# Patient Record
Sex: MALE | Race: WHITE | NOT HISPANIC OR LATINO | Employment: FULL TIME | URBAN - METROPOLITAN AREA
[De-identification: names, ages, dates, MRNs, and addresses within clinical notes are randomized per-mention and may not be internally consistent; named-entity substitution may affect disease eponyms.]

---

## 2017-01-30 ENCOUNTER — ALLSCRIPTS OFFICE VISIT (OUTPATIENT)
Dept: OTHER | Facility: OTHER | Age: 67
End: 2017-01-30

## 2017-07-17 DIAGNOSIS — D64.9 ANEMIA: ICD-10-CM

## 2017-07-17 DIAGNOSIS — C18.9 MALIGNANT NEOPLASM OF COLON (HCC): ICD-10-CM

## 2017-08-31 ENCOUNTER — ALLSCRIPTS OFFICE VISIT (OUTPATIENT)
Dept: OTHER | Facility: OTHER | Age: 67
End: 2017-08-31

## 2018-01-13 VITALS
WEIGHT: 138.25 LBS | BODY MASS INDEX: 23.03 KG/M2 | OXYGEN SATURATION: 98 % | HEIGHT: 65 IN | TEMPERATURE: 97.3 F | SYSTOLIC BLOOD PRESSURE: 132 MMHG | RESPIRATION RATE: 16 BRPM | DIASTOLIC BLOOD PRESSURE: 82 MMHG | HEART RATE: 73 BPM

## 2018-01-14 VITALS
SYSTOLIC BLOOD PRESSURE: 142 MMHG | BODY MASS INDEX: 22.53 KG/M2 | OXYGEN SATURATION: 99 % | DIASTOLIC BLOOD PRESSURE: 76 MMHG | WEIGHT: 135.25 LBS | HEIGHT: 65 IN | TEMPERATURE: 97.1 F | HEART RATE: 68 BPM | RESPIRATION RATE: 15 BRPM

## 2018-02-19 DIAGNOSIS — D64.9 ANEMIA: ICD-10-CM

## 2018-02-19 DIAGNOSIS — C18.9 MALIGNANT NEOPLASM OF COLON (HCC): ICD-10-CM

## 2018-03-08 ENCOUNTER — OFFICE VISIT (OUTPATIENT)
Dept: HEMATOLOGY ONCOLOGY | Facility: CLINIC | Age: 68
End: 2018-03-08
Payer: COMMERCIAL

## 2018-03-08 VITALS
HEART RATE: 73 BPM | WEIGHT: 140.4 LBS | BODY MASS INDEX: 23.39 KG/M2 | DIASTOLIC BLOOD PRESSURE: 78 MMHG | HEIGHT: 65 IN | RESPIRATION RATE: 16 BRPM | OXYGEN SATURATION: 98 % | TEMPERATURE: 96.8 F | SYSTOLIC BLOOD PRESSURE: 142 MMHG

## 2018-03-08 DIAGNOSIS — C18.2 MALIGNANT NEOPLASM OF ASCENDING COLON (HCC): Primary | ICD-10-CM

## 2018-03-08 PROCEDURE — 99214 OFFICE O/P EST MOD 30 MIN: CPT | Performed by: INTERNAL MEDICINE

## 2018-03-08 RX ORDER — MELATONIN: COMMUNITY

## 2018-03-08 RX ORDER — LEVOTHYROXINE SODIUM 137 UG/1
TABLET ORAL
COMMUNITY

## 2018-03-08 NOTE — LETTER
March 8, 2018     Kit Mart MD  [place] [address]  [address]  [facility] [address]    Patient: Abbi Meng   YOB: 1950   Date of Visit: 3/8/2018       Dear Dr Ulloa Gains:    Thank you for referring Jay Campos to me for evaluation  Below are my notes for this consultation  If you have questions, please do not hesitate to call me  I look forward to following your patient along with you  Sincerely,        Melvina Ladd MD        CC: No Recipients  Melvina Ladd MD  3/8/2018 10:49 PM  Sign at close encounter    HPI:   Abbi Meng is a 79 y o  male  This is a follow-up visit for stage II right colon cancer in 2011 with low risk features and patient had right hemicolectomy  He did not require adjuvant chemotherapy  There has not been recurrence of colon cancer  Patient has been regular with colonoscopy and he states he will be going for EGD and colonoscopy in a month  He has history of iron deficiency and he has been taking 1 iron tablet daily  He is not anemic anymore and ferritin level has normalized  Patient is vegetarian     He has gynecomastia  He had breast imaging studies and there was no malignancy  May 2015 patient had total thyroidectomy for thyroid nodule and final path report was Hashimoto thyroiditis  He is on Synthroid  He has much less tiredness    He goes to gym 4 days a week    Current Outpatient Prescriptions:     B Complex Vitamins (VITAMIN B COMPLEX PO), Take 1 capsule by mouth, Disp: , Rfl:     Cholecalciferol (VITAMIN D3) 400 units CAPS, Take by mouth, Disp: , Rfl:     cyanocobalamin (CVS VITAMIN B-12) 1000 MCG tablet, Take by mouth, Disp: , Rfl:     IRON PO, Take 325 mg by mouth, Disp: , Rfl:     levothyroxine (SYNTHROID) 137 mcg tablet, Take by mouth, Disp: , Rfl:     No Known Allergies    ROS:  03/08/18 Reviewed 13 systems:  Presently no headaches, seizures, dizziness, diplopia, dysphagia, hoarseness, chest pain, palpitations, shortness of breath, cough, hemoptysis, abdominal pain, nausea, vomiting, change in bowel habits, melena, hematuria, fever, chills, bleeding, bone pains, skin rash, weight loss, arthritic symptoms, numbness,  weakness, claudication and gait problem  No frequent infections  No hot flashes  Not unusually sensitive to heat or cold  Patient is not anxious     No swelling of the ankles  No swollen glands  /78 (BP Location: Right arm, Cuff Size: Adult)   Pulse 73   Temp (!) 96 8 °F (36 °C) (Tympanic)   Resp 16   Ht 5' 5" (1 651 m)   Wt 63 7 kg (140 lb 6 4 oz)   SpO2 98%   BMI 23 36 kg/m²      Physical Exam:  Alert, oriented, not in distress, no icterus, no oral thrush, no palpable neck mass, clear lung fields, regular heart rate, abdomen  soft and non tender, no palpable abdominal mass, no ascites, no edema of ankles, no calf tenderness, no focal neurological deficit, no skin rash, no palpable lymphadenopathy, good arterial pulses, no clubbing  Patient is not anxious  Performance status 0      IMAGING:  No orders to display       Lab Results  Results for orders placed or performed in visit on 07/18/16   CEA (HISTORICAL)   Result Value Ref Range    CARCINOEMBRYONIC ANTIGEN 1 4 ng/mL   CBC AND DIFFERENTIAL (HISTORICAL)   Result Value Ref Range    WBC 4 3 3 8 - 10 8 Thousand/uL    RBC 4 23 4 20 - 5 80 Million/uL    Hemoglobin 12 5 (L) 13 2 - 17 1 g/dL    Hematocrit 37 7 (L) 38 5 - 50 0 %    MCV 89 3 80 0 - 100 0 fL    MCH 29 5 27 0 - 33 0 pg    MCHC 33 0 32 0 - 36 0 g/dL    Platelets 950 710 - 632 Thousand/uL    RDW 13 7 11 0 - 15 0 %    Neutrophils Absolute 3345 1500 - 7800 cells/uL    Lymphocytes Absolute 430 (L) 850 - 3900 cells/uL    Monocytes Absolute 275 200 - 950 cells/uL    Eosinophils Absolute 228 15 - 500 cells/uL    Basophils Absolute 22 0 - 200 cells/uL    Neutrophils Absolute 77 8 %    Lymphocytes Absolute 10 0 %    MONOCYTES 6 4 %    Eosinophils Absolute 5 3 %    Basophils Relative 0 5 %    MPV 8 7 7 5 - 11 5 fL   COMPREHENSIVE METABOLIC PANEL (HISTORICAL)   Result Value Ref Range    Glucose 112 (H) 65 - 99 mg/dL    BUN 14 7 - 25 mg/dL    Creatinine 0 85 0 70 - 1 25 mg/dL    EGFR-AMERICAN CALC 91 > OR = 60 mL/min/1 73m2    eGFR  105 > OR = 60 mL/min/1 73m2    BUN/CREA Ratio NOT APPLICABLE 6 - 22 (calc)    Sodium 141 135 - 146 mmol/L    Potassium 4 5 3 5 - 5 3 mmol/L    Chloride 104 98 - 110 mmol/L    CO2 28 20 - 31 mmol/L    Calcium 9 2 8 6 - 10 3 mg/dL    Total Protein 7 1 6 1 - 8 1 g/dL    Albumin 4 1 3 6 - 5 1 g/dL    GAMMA GLOBULIN 3 0 1 9 - 3 7 g/dL (calc)    A/G RATIO 1 4 1 0 - 2 5 (calc)    Total Bilirubin 0 9 0 2 - 1 2 mg/dL    Alkaline Phosphatase 68 40 - 115 U/L    AST 16 10 - 35 U/L    ALT 12 9 - 46 U/L             Reviewed and discussed with patient  Assessment and plan:  Vitaliy Dexter is a 79 y o  male with stage II right colon cancer in 2011 with low risk features and he had right hemicolectomy  Colon cancer has been in remission  Iron deficiency anemia and he has been taking 1 iron tablet daily  Hemoglobin is 12 5 and that is stable  He will continue to take 1 iron tablet daily  He will go for EGD and colonoscopy  Condition discussed and explained  Questions answered  He will come back in 6 months  Patient voiced understanding and agreement in the discussion  Counseling / Coordination of Care   Greater than 50% of total time was spent with the patient and / or family counseling and / or coordination of care

## 2018-03-08 NOTE — PROGRESS NOTES
HPI:   Batsheva Maciel is a 79 y o  male  This is a follow-up visit for stage II right colon cancer in 2011 with low risk features and patient had right hemicolectomy  He did not require adjuvant chemotherapy  There has not been recurrence of colon cancer  Patient has been regular with colonoscopy and he states he will be going for EGD and colonoscopy in a month  He has history of iron deficiency and he has been taking 1 iron tablet daily  He is not anemic anymore and ferritin level has normalized  Patient is vegetarian     He has gynecomastia  He had breast imaging studies and there was no malignancy  May 2015 patient had total thyroidectomy for thyroid nodule and final path report was Hashimoto thyroiditis  He is on Synthroid  He has much less tiredness    He goes to gym 4 days a week    Current Outpatient Prescriptions:     B Complex Vitamins (VITAMIN B COMPLEX PO), Take 1 capsule by mouth, Disp: , Rfl:     Cholecalciferol (VITAMIN D3) 400 units CAPS, Take by mouth, Disp: , Rfl:     cyanocobalamin (CVS VITAMIN B-12) 1000 MCG tablet, Take by mouth, Disp: , Rfl:     IRON PO, Take 325 mg by mouth, Disp: , Rfl:     levothyroxine (SYNTHROID) 137 mcg tablet, Take by mouth, Disp: , Rfl:     No Known Allergies    ROS:  03/08/18 Reviewed 13 systems:  Presently no headaches, seizures, dizziness, diplopia, dysphagia, hoarseness, chest pain, palpitations, shortness of breath, cough, hemoptysis, abdominal pain, nausea, vomiting, change in bowel habits, melena, hematuria, fever, chills, bleeding, bone pains, skin rash, weight loss, arthritic symptoms, numbness,  weakness, claudication and gait problem  No frequent infections  No hot flashes  Not unusually sensitive to heat or cold  Patient is not anxious     No swelling of the ankles  No swollen glands        /78 (BP Location: Right arm, Cuff Size: Adult)   Pulse 73   Temp (!) 96 8 °F (36 °C) (Tympanic)   Resp 16   Ht 5' 5" (1 651 m)   Wt 63 7 kg (140 lb 6 4 oz)   SpO2 98%   BMI 23 36 kg/m²     Physical Exam:  Alert, oriented, not in distress, no icterus, no oral thrush, no palpable neck mass, clear lung fields, regular heart rate, abdomen  soft and non tender, no palpable abdominal mass, no ascites, no edema of ankles, no calf tenderness, no focal neurological deficit, no skin rash, no palpable lymphadenopathy, good arterial pulses, no clubbing  Patient is not anxious  Performance status 0      IMAGING:  No orders to display       Lab Results  Results for orders placed or performed in visit on 07/18/16   CEA (HISTORICAL)   Result Value Ref Range    CARCINOEMBRYONIC ANTIGEN 1 4 ng/mL   CBC AND DIFFERENTIAL (HISTORICAL)   Result Value Ref Range    WBC 4 3 3 8 - 10 8 Thousand/uL    RBC 4 23 4 20 - 5 80 Million/uL    Hemoglobin 12 5 (L) 13 2 - 17 1 g/dL    Hematocrit 37 7 (L) 38 5 - 50 0 %    MCV 89 3 80 0 - 100 0 fL    MCH 29 5 27 0 - 33 0 pg    MCHC 33 0 32 0 - 36 0 g/dL    Platelets 647 281 - 493 Thousand/uL    RDW 13 7 11 0 - 15 0 %    Neutrophils Absolute 3345 1500 - 7800 cells/uL    Lymphocytes Absolute 430 (L) 850 - 3900 cells/uL    Monocytes Absolute 275 200 - 950 cells/uL    Eosinophils Absolute 228 15 - 500 cells/uL    Basophils Absolute 22 0 - 200 cells/uL    Neutrophils Absolute 77 8 %    Lymphocytes Absolute 10 0 %    MONOCYTES 6 4 %    Eosinophils Absolute 5 3 %    Basophils Relative 0 5 %    MPV 8 7 7 5 - 11 5 fL   COMPREHENSIVE METABOLIC PANEL (HISTORICAL)   Result Value Ref Range    Glucose 112 (H) 65 - 99 mg/dL    BUN 14 7 - 25 mg/dL    Creatinine 0 85 0 70 - 1 25 mg/dL    EGFR-AMERICAN CALC 91 > OR = 60 mL/min/1 73m2    eGFR  105 > OR = 60 mL/min/1 73m2    BUN/CREA Ratio NOT APPLICABLE 6 - 22 (calc)    Sodium 141 135 - 146 mmol/L    Potassium 4 5 3 5 - 5 3 mmol/L    Chloride 104 98 - 110 mmol/L    CO2 28 20 - 31 mmol/L    Calcium 9 2 8 6 - 10 3 mg/dL    Total Protein 7 1 6 1 - 8 1 g/dL    Albumin 4 1 3 6 - 5 1 g/dL    GAMMA GLOBULIN 3 0 1 9 - 3 7 g/dL (calc)    A/G RATIO 1 4 1 0 - 2 5 (calc)    Total Bilirubin 0 9 0 2 - 1 2 mg/dL    Alkaline Phosphatase 68 40 - 115 U/L    AST 16 10 - 35 U/L    ALT 12 9 - 46 U/L             Reviewed and discussed with patient  Assessment and plan:  Emilee Bautista is a 79 y o  male with stage II right colon cancer in 2011 with low risk features and he had right hemicolectomy  Colon cancer has been in remission  Iron deficiency anemia and he has been taking 1 iron tablet daily  Hemoglobin is 12 5 and that is stable  He will continue to take 1 iron tablet daily  He will go for EGD and colonoscopy  Condition discussed and explained  Questions answered  He will come back in 6 months  Patient voiced understanding and agreement in the discussion  Counseling / Coordination of Care   Greater than 50% of total time was spent with the patient and / or family counseling and / or coordination of care

## 2018-09-11 ENCOUNTER — TELEPHONE (OUTPATIENT)
Dept: HEMATOLOGY ONCOLOGY | Facility: CLINIC | Age: 68
End: 2018-09-11

## 2018-11-14 ENCOUNTER — TELEPHONE (OUTPATIENT)
Dept: HEMATOLOGY ONCOLOGY | Facility: CLINIC | Age: 68
End: 2018-11-14

## 2018-11-15 ENCOUNTER — TELEPHONE (OUTPATIENT)
Dept: HEMATOLOGY ONCOLOGY | Facility: CLINIC | Age: 68
End: 2018-11-15

## 2018-11-15 NOTE — TELEPHONE ENCOUNTER
Called patient to move appt to an earlier time slot  Patient was fine with moving appt time  Patients new appt time is 2:40 pm on 11/15/18, today

## 2018-11-30 ENCOUNTER — TELEPHONE (OUTPATIENT)
Dept: HEMATOLOGY ONCOLOGY | Facility: CLINIC | Age: 68
End: 2018-11-30

## 2018-11-30 NOTE — TELEPHONE ENCOUNTER
Called to inform patient to have his labs completed prior to his appt  His son answered and informed me that he would contact his father and let him know

## 2018-12-03 ENCOUNTER — OFFICE VISIT (OUTPATIENT)
Dept: HEMATOLOGY ONCOLOGY | Facility: CLINIC | Age: 68
End: 2018-12-03
Payer: COMMERCIAL

## 2018-12-03 VITALS
OXYGEN SATURATION: 97 % | SYSTOLIC BLOOD PRESSURE: 124 MMHG | WEIGHT: 138.4 LBS | DIASTOLIC BLOOD PRESSURE: 80 MMHG | RESPIRATION RATE: 10 BRPM | HEIGHT: 65 IN | HEART RATE: 58 BPM | TEMPERATURE: 97.7 F | BODY MASS INDEX: 23.06 KG/M2

## 2018-12-03 DIAGNOSIS — C18.2 MALIGNANT NEOPLASM OF ASCENDING COLON (HCC): Primary | ICD-10-CM

## 2018-12-03 PROCEDURE — 99214 OFFICE O/P EST MOD 30 MIN: CPT | Performed by: INTERNAL MEDICINE

## 2018-12-03 NOTE — LETTER
December 3, 2018     Sunday Aburto MD  [place] [address]  [address]  [place] [address]    Patient: Jerel Arredondo   YOB: 1950   Date of Visit: 12/3/2018       Dear Dr Annelle Hatchet:    Thank you for referring Stacy Lares to me for evaluation  Below are my notes for this consultation  If you have questions, please do not hesitate to call me  I look forward to following your patient along with you  Sincerely,        Arnol Ruelas MD        CC: No Recipients  Arnol Ruelas MD  12/3/2018 10:57 PM  Sign at close encounter    HPI:   Patient is here with his wife  In 2011 he had surgery for stage II right colon cancer and he did not require adjuvant systemic chemotherapy and he has remained in remission from colon cancer  Bowel symptoms are regular  He is maintaining his weight  History of iron deficiency anemia and patient has been taking 1 iron tablet daily  Hemoglobin is 14 1 and that is stable  He will continue to take 1 iron tablet daily  He  had EGD recently  and he goes for colonoscopy on regular basis  Has minor arthritic symptoms       Current Outpatient Prescriptions:     B Complex Vitamins (VITAMIN B COMPLEX PO), Take 1 capsule by mouth, Disp: , Rfl:     Cholecalciferol (VITAMIN D3) 400 units CAPS, Take by mouth, Disp: , Rfl:     cyanocobalamin (CVS VITAMIN B-12) 1000 MCG tablet, Take by mouth, Disp: , Rfl:     IRON PO, Take 325 mg by mouth, Disp: , Rfl:     levothyroxine (SYNTHROID) 137 mcg tablet, Take by mouth, Disp: , Rfl:     No Known Allergies     No history exists  ROS:  12/03/18 Reviewed 13 systems:  Presently no other neurological, cardiac, pulmonary, GI and  symptoms other than listed in HPI  Other symptoms as in HPI  No other symptoms like fever, chills, bleeding, bone pains, skin rash, weight loss,  tiredness ,  weakness, numbness,  claudication and gait problem  No frequent infections  Not unusually sensitive to heat or cold   No swelling of the ankles  No swollen glands  Patient is anxious  /80 (BP Location: Left arm, Patient Position: Sitting, Cuff Size: Adult)   Pulse 58   Temp 97 7 °F (36 5 °C)   Resp (!) 10   Ht 5' 5" (1 651 m)   Wt 62 8 kg (138 lb 6 4 oz)   SpO2 97%   BMI 23 03 kg/m²      Physical Exam:    Patient is alert and oriented  He is not in distress  Vital signs are stable  There is no scleral icterus  Oral cavity is clear  There is no palpable neck mass  Lung fields are clear to percussion and auscultation  Heart rate is regular  Abdomen is  soft and non tender, no palpable abdominal mass, no ascites, no edema of ankles, no calf tenderness, no focal neurological deficit, no skin rash, no palpable lymphadenopathy, good arterial pulses, no clubbing  Patient is anxious  Performance status 0  IMAGING:  Patient tells me that he had EGD recently and there was no cancer    Labs, Imaging, & Other studies:  Normal blood counts, chemistry, normal ferritin and B12  Hemoglobin A1c 6 5  CEA 2 4  All pertinent labs and imaging studies were personally reviewed      Reviewed and discussed with patient  Assessment and plan:    Patient is here with his wife  In 2011 he had surgery for stage II right colon cancer and he did not require adjuvant systemic chemotherapy and he has remained in remission from colon cancer  Bowel symptoms are regular  He is maintaining his weight  History of iron deficiency anemia and patient has been taking 1 iron tablet daily  Hemoglobin is 14 1 and that is stable  He will continue to take 1 iron tablet daily  He  had EGD recently  and he goes for colonoscopy on regular basis  Has minor arthritic symptoms       Physical examination and test results are as recorded and discussed  Patient is in remission from colon cancer  Goal is cure from colon cancer  Anemia has improved  Condition discussed and explained  Questions answered  Plan is surveillance and 1 iron tablet daily  Discussed the importance of eating healthy foods, staying active and health screening test   Patient is capable self-care  1  Malignant neoplasm of ascending colon (HCC)    - CBC and differential; Future  - CEA; Future  - Comprehensive metabolic panel; Future          Patient voiced understanding and agreement in the discussion  Counseling / Coordination of Care   Greater than 50% of total time was spent with the patient and / or family counseling and / or coordination of care

## 2018-12-03 NOTE — PROGRESS NOTES
HPI:   Patient is here with his wife  In 2011 he had surgery for stage II right colon cancer and he did not require adjuvant systemic chemotherapy and he has remained in remission from colon cancer  Bowel symptoms are regular  He is maintaining his weight  History of iron deficiency anemia and patient has been taking 1 iron tablet daily  Hemoglobin is 14 1 and that is stable  He will continue to take 1 iron tablet daily  He had EGD recently  and he goes for colonoscopy on regular basis  Has minor arthritic symptoms       Current Outpatient Prescriptions:     B Complex Vitamins (VITAMIN B COMPLEX PO), Take 1 capsule by mouth, Disp: , Rfl:     Cholecalciferol (VITAMIN D3) 400 units CAPS, Take by mouth, Disp: , Rfl:     cyanocobalamin (CVS VITAMIN B-12) 1000 MCG tablet, Take by mouth, Disp: , Rfl:     IRON PO, Take 325 mg by mouth, Disp: , Rfl:     levothyroxine (SYNTHROID) 137 mcg tablet, Take by mouth, Disp: , Rfl:     No Known Allergies     No history exists  ROS:  12/03/18 Reviewed 13 systems:  Presently no other neurological, cardiac, pulmonary, GI and  symptoms other than listed in HPI  Other symptoms as in HPI  No other symptoms like fever, chills, bleeding, bone pains, skin rash, weight loss,  tiredness ,  weakness, numbness,  claudication and gait problem  No frequent infections  Not unusually sensitive to heat or cold  No swelling of the ankles  No swollen glands  Patient is anxious  /80 (BP Location: Left arm, Patient Position: Sitting, Cuff Size: Adult)   Pulse 58   Temp 97 7 °F (36 5 °C)   Resp (!) 10   Ht 5' 5" (1 651 m)   Wt 62 8 kg (138 lb 6 4 oz)   SpO2 97%   BMI 23 03 kg/m²     Physical Exam:    Patient is alert and oriented  He is not in distress  Vital signs are stable  There is no scleral icterus  Oral cavity is clear  There is no palpable neck mass  Lung fields are clear to percussion and auscultation  Heart rate is regular    Abdomen is  soft and non tender, no palpable abdominal mass, no ascites, no edema of ankles, no calf tenderness, no focal neurological deficit, no skin rash, no palpable lymphadenopathy, good arterial pulses, no clubbing  Patient is anxious  Performance status 0  IMAGING:  Patient tells me that he had EGD recently and there was no cancer    Labs, Imaging, & Other studies:  Normal blood counts, chemistry, normal ferritin and B12  Hemoglobin A1c 6 5  CEA 2 4  All pertinent labs and imaging studies were personally reviewed      Reviewed and discussed with patient  Assessment and plan:    Patient is here with his wife  In 2011 he had surgery for stage II right colon cancer and he did not require adjuvant systemic chemotherapy and he has remained in remission from colon cancer  Bowel symptoms are regular  He is maintaining his weight  History of iron deficiency anemia and patient has been taking 1 iron tablet daily  Hemoglobin is 14 1 and that is stable  He will continue to take 1 iron tablet daily  He had EGD recently  and he goes for colonoscopy on regular basis  Has minor arthritic symptoms       Physical examination and test results are as recorded and discussed  Patient is in remission from colon cancer  Goal is cure from colon cancer  Anemia has improved  Condition discussed and explained  Questions answered  Plan is surveillance and 1 iron tablet daily  Discussed the importance of eating healthy foods, staying active and health screening test   Patient is capable self-care  1  Malignant neoplasm of ascending colon (HCC)    - CBC and differential; Future  - CEA; Future  - Comprehensive metabolic panel; Future          Patient voiced understanding and agreement in the discussion  Counseling / Coordination of Care   Greater than 50% of total time was spent with the patient and / or family counseling and / or coordination of care

## 2019-06-05 ENCOUNTER — TELEPHONE (OUTPATIENT)
Dept: HEMATOLOGY ONCOLOGY | Facility: CLINIC | Age: 69
End: 2019-06-05

## 2019-07-10 ENCOUNTER — TELEPHONE (OUTPATIENT)
Dept: HEMATOLOGY ONCOLOGY | Facility: CLINIC | Age: 69
End: 2019-07-10

## 2019-07-10 LAB
CREAT ?TM UR-SCNC: 123 UMOL/L
EXT MICROALBUMIN URINE RANDOM: 1
HBA1C MFR BLD HPLC: 6.5 %
MICROALBUMIN/CREAT UR: 8.1 MG/G{CREAT}

## 2019-07-10 NOTE — TELEPHONE ENCOUNTER
Spoke to patient son and informed patient son to have his labs completed prior to appt  His son informed me they went to [facility]  Labs printed

## 2019-07-11 ENCOUNTER — OFFICE VISIT (OUTPATIENT)
Dept: HEMATOLOGY ONCOLOGY | Facility: CLINIC | Age: 69
End: 2019-07-11
Payer: COMMERCIAL

## 2019-07-11 VITALS
RESPIRATION RATE: 18 BRPM | HEIGHT: 65 IN | TEMPERATURE: 98.7 F | DIASTOLIC BLOOD PRESSURE: 82 MMHG | HEART RATE: 60 BPM | BODY MASS INDEX: 22.56 KG/M2 | WEIGHT: 135.4 LBS | OXYGEN SATURATION: 97 % | SYSTOLIC BLOOD PRESSURE: 142 MMHG

## 2019-07-11 DIAGNOSIS — C18.2 MALIGNANT NEOPLASM OF ASCENDING COLON (HCC): Primary | ICD-10-CM

## 2019-07-11 PROCEDURE — 99213 OFFICE O/P EST LOW 20 MIN: CPT | Performed by: INTERNAL MEDICINE

## 2019-07-11 NOTE — PROGRESS NOTES
HPI:   Patient is here with his wife  Follow-up visit for stage II, clinically low risk cancer of ascending colon and for that patient had surgery in 2011  Patient did not need adjuvant systemic therapy  He is being followed  There has not been recurrent or metastatic disease from colon cancer  He had follow-up imaging studies and colonoscopy  He  also had EGD for anemia  He had 1 iron tablet daily  He is not anemic  He is feeling better and does not have any symptom at present except for minor arthritic symptoms       Current Outpatient Medications:     cholecalciferol (VITAMIN D3) 1,000 units tablet, Take by mouth , Disp: , Rfl:     cyanocobalamin (CVS VITAMIN B-12) 1000 MCG tablet, Take by mouth, Disp: , Rfl:     IRON PO, Take 325 mg by mouth, Disp: , Rfl:     levothyroxine (SYNTHROID) 137 mcg tablet, Take by mouth, Disp: , Rfl:     B Complex Vitamins (VITAMIN B COMPLEX PO), Take 1 capsule by mouth, Disp: , Rfl:     No Known Allergies     No history exists  ROS:  07/11/19 Reviewed 13 systems:  Presently no headaches, seizures, dizziness, diplopia, dysphagia, hoarseness, chest pain, palpitations, shortness of breath, cough, hemoptysis, abdominal pain, nausea, vomiting, change in bowel habits, melena, hematuria, fever, chills, bleeding, bone pains, skin rash, weight loss,  tiredness ,  weakness, numbness,  claudication and gait problem  No frequent infections  Not unusually sensitive to heat or cold  No swelling of the ankles  No swollen glands  Patient is not anxious   Other symptoms are in HPI        /82 (BP Location: Left arm, Patient Position: Sitting, Cuff Size: Adult)   Pulse 60   Temp 98 7 °F (37 1 °C)   Resp 18   Ht 5' 5" (1 651 m)   Wt 61 4 kg (135 lb 6 4 oz)   SpO2 97%   BMI 22 53 kg/m²     Physical Exam:  Alert, oriented, not in distress, no icterus, no oral thrush, no palpable neck mass, clear lung fields, regular heart rate, abdomen  soft and non tender, no palpable abdominal mass, no ascites, no edema of ankles, no calf tenderness, no focal neurological deficit, no skin rash, no palpable lymphadenopathy, good arterial pulses, no clubbing  Patient is not anxious  Performance status 0  IMAGING:      Labs, Imaging, & Other studies:   Hemoglobin 14 1  WBC 4800  Platelets [id number]  Normal chemistry profile  CEA 2 6  Normal ferritin  All pertinent labs and imaging studies were personally reviewed      Reviewed and discussed with patient  Assessment and plan:  Patient is here with his wife  Follow-up visit for stage II, clinically low risk cancer of ascending colon and for that patient had surgery in 2011  Patient did not need adjuvant systemic therapy  He is being followed  There has not been recurrent or metastatic disease from colon cancer  He had follow-up imaging studies and colonoscopy  He  also had EGD for anemia  He had 1 iron tablet daily  He is not anemic  He is feeling better and does not have any symptom at present except for minor arthritic symptoms       Physical examination and test results are as recorded and discussed  Patient is in remission from colon cancer  Goal is cure from colon cancer  Anemia has improved  Condition discussed and explained  Questions answered  Plan is surveillance  He wants to continue taking  1 iron tablet daily because he is vegetarian  Discussed the importance of eating healthy foods, staying active and health screening test   Patient is capable self-care  1  Malignant neoplasm of ascending colon (HCC)    - CBC and differential; Future  - CEA; Future  - Comprehensive metabolic panel; Future        Patient voiced understanding and agreement in the discussion  Counseling / Coordination of Care   Greater than 50% of total time was spent with the patient and / or family counseling and / or coordination of care

## 2019-07-11 NOTE — LETTER
July 11, 2019     Coby Reynaga MD  [facility] [address]    Patient: David Ely   YOB: 1950   Date of Visit: 7/11/2019       Dear Dr Pamela Sommers:    Thank you for referring Uma Tavarez to me for evaluation  Below are my notes for this consultation  If you have questions, please do not hesitate to call me  I look forward to following your patient along with you  Sincerely,        Jil Najera MD        CC: No Recipients  Jil Najera MD  7/11/2019  6:49 PM  Sign at close encounter    HPI:   Patient is here with his wife  Follow-up visit for stage II, clinically low risk cancer of ascending colon and for that patient had surgery in 2011  Patient did not need adjuvant systemic therapy  He is being followed  There has not been recurrent or metastatic disease from colon cancer  He had follow-up imaging studies and colonoscopy  He  also had EGD for anemia  He had 1 iron tablet daily  He is not anemic  He is feeling better and does not have any symptom at present except for minor arthritic symptoms       Current Outpatient Medications:     cholecalciferol (VITAMIN D3) 1,000 units tablet, Take by mouth , Disp: , Rfl:     cyanocobalamin ([employer] VITAMIN B-12) 1000 MCG tablet, Take by mouth, Disp: , Rfl:     IRON PO, Take 325 mg by mouth, Disp: , Rfl:     levothyroxine (SYNTHROID) 137 mcg tablet, Take by mouth, Disp: , Rfl:     B Complex Vitamins (VITAMIN B COMPLEX PO), Take 1 capsule by mouth, Disp: , Rfl:     No Known Allergies     No history exists         ROS:  07/11/19 Reviewed 13 systems:  Presently no headaches, seizures, dizziness, diplopia, dysphagia, hoarseness, chest pain, palpitations, shortness of breath, cough, hemoptysis, abdominal pain, nausea, vomiting, change in bowel habits, melena, hematuria, fever, chills, bleeding, bone pains, skin rash, weight loss,  tiredness ,  weakness, numbness,  claudication and gait problem  No frequent infections  Not unusually sensitive to heat or cold  No swelling of the ankles  No swollen glands  Patient is not anxious  Other symptoms are in HPI        /82 (BP Location: Left arm, Patient Position: Sitting, Cuff Size: Adult)   Pulse 60   Temp 98 7 °F (37 1 °C)   Resp 18   Ht 5' 5" (1 651 m)   Wt 61 4 kg (135 lb 6 4 oz)   SpO2 97%   BMI 22 53 kg/m²      Physical Exam:  Alert, oriented, not in distress, no icterus, no oral thrush, no palpable neck mass, clear lung fields, regular heart rate, abdomen  soft and non tender, no palpable abdominal mass, no ascites, no edema of ankles, no calf tenderness, no focal neurological deficit, no skin rash, no palpable lymphadenopathy, good arterial pulses, no clubbing  Patient is not anxious  Performance status 0  IMAGING:      Labs, Imaging, & Other studies:   Hemoglobin 14 1  WBC 4800  Platelets [id number]  Normal chemistry profile  CEA 2 6  Normal ferritin  All pertinent labs and imaging studies were personally reviewed      Reviewed and discussed with patient  Assessment and plan:  Patient is here with his wife  Follow-up visit for stage II, clinically low risk cancer of ascending colon and for that patient had surgery in 2011  Patient did not need adjuvant systemic therapy  He is being followed  There has not been recurrent or metastatic disease from colon cancer  He had follow-up imaging studies and colonoscopy  He  also had EGD for anemia  He had 1 iron tablet daily  He is not anemic  He is feeling better and does not have any symptom at present except for minor arthritic symptoms       Physical examination and test results are as recorded and discussed  Patient is in remission from colon cancer  Goal is cure from colon cancer  Anemia has improved  Condition discussed and explained  Questions answered  Plan is surveillance  He wants to continue taking  1 iron tablet daily because he is vegetarian  Discussed the importance of eating healthy foods, staying active and health screening test   Patient is capable self-care  1  Malignant neoplasm of ascending colon (HCC)    - CBC and differential; Future  - CEA; Future  - Comprehensive metabolic panel; Future        Patient voiced understanding and agreement in the discussion  Counseling / Coordination of Care   Greater than 50% of total time was spent with the patient and / or family counseling and / or coordination of care

## 2020-03-31 ENCOUNTER — TELEPHONE (OUTPATIENT)
Dept: HEMATOLOGY ONCOLOGY | Facility: CLINIC | Age: 70
End: 2020-03-31

## 2020-03-31 NOTE — TELEPHONE ENCOUNTER
I gave lab results to the patient especially liver function tests and he said he was in [facility] and got sick but now he is feeling better and he will follow up with his primary physician for liver function tests

## 2023-03-30 LAB — HCV AB SER-ACNC: NEGATIVE

## 2023-04-28 ENCOUNTER — CONSULT (OUTPATIENT)
Dept: HEMATOLOGY ONCOLOGY | Facility: CLINIC | Age: 73
End: 2023-04-28

## 2023-04-28 VITALS
HEART RATE: 66 BPM | DIASTOLIC BLOOD PRESSURE: 70 MMHG | BODY MASS INDEX: 21 KG/M2 | TEMPERATURE: 97.5 F | SYSTOLIC BLOOD PRESSURE: 150 MMHG | OXYGEN SATURATION: 100 % | WEIGHT: 123 LBS | HEIGHT: 64 IN

## 2023-04-28 DIAGNOSIS — R97.8 ELEVATED TUMOR MARKERS: ICD-10-CM

## 2023-04-28 DIAGNOSIS — C18.2 MALIGNANT NEOPLASM OF ASCENDING COLON (HCC): Primary | ICD-10-CM

## 2023-04-28 DIAGNOSIS — E03.9 ACQUIRED HYPOTHYROIDISM: ICD-10-CM

## 2023-04-28 NOTE — LETTER
April 30, 2023     Konstantin Khan MD  [facility] [address]    Patient: Mary Lou Ramirez   YOB: 1950   Date of Visit: 4/28/2023       Dear Dr Reggie Patton:    Thank you for referring Herlinda Chadwick to me for evaluation  Below are my notes for this consultation  If you have questions, please do not hesitate to call me  I look forward to following your patient along with you  Sincerely,        Juan C Marte MD        CC: No Recipients  Juan C Marte MD  4/30/2023  6:25 PM  Sign when Signing Visit    HPI:   Patient is here with his wife  In 2011 patient had right hemicolectomy for  stage II, clinically low risk cancer of ascending colon  He did not require adjuvant therapy  He was being followed until couple of years ago  Recently he was found to have abnormal liver function tests and increase in CEA to 8 1  Follow-up blood work showed improved liver function tests and CEA has come down to 5 9  He had a colonoscopy recently and that showed a tubular adenoma  He is feeling well  He has cut out sugar and fat from his diet  He is a vegetarian  He has stopped losing weight  He had lost some weight in the last few months  He states he is feeling well overall  He does not have any symptom at present except for minor arthritic symptoms                                   Current Outpatient Medications:     levothyroxine 137 mcg tablet, Take by mouth, Disp: , Rfl:     B Complex Vitamins (VITAMIN B COMPLEX PO), Take 1 capsule by mouth (Patient not taking: Reported on 4/28/2023), Disp: , Rfl:     cholecalciferol (VITAMIN D3) 1,000 units tablet, Take by mouth  (Patient not taking: Reported on 4/28/2023), Disp: , Rfl:     cyanocobalamin (VITAMIN B-12) 1000 MCG tablet, Take by mouth (Patient not taking: Reported on 4/28/2023), Disp: , Rfl:     IRON PO, Take 325 mg by mouth (Patient not taking: Reported on 4/28/2023), Disp: , Rfl:     No Known Allergies    Oncology History "No history exists  ROS:   Reviewed 13 systems:  Presently no neurological, cardiac, pulmonary, GI and  symptoms  Other symptoms are in HPI  No  fever, chills, bleeding, bone pains, skin rash,   tiredness ,  weakness, numbness,  claudication and gait problem  No frequent infections  Not unusually sensitive to heat or cold  No swelling of the ankles  No swollen glands  Patient is not anxious  /70 (BP Location: Left arm, Patient Position: Sitting, Cuff Size: Standard)   Pulse 66   Temp 97 5 °F (36 4 °C) (Temporal)   Ht 5' 4\" (1 626 m)   Wt 55 8 kg (123 lb)   SpO2 100%   BMI 21 11 kg/m²     Physical Exam:  Patient is alert and oriented  Patient is not in distress  Vital signs are above  There is no icterus  There is no oral thrush  There is no palpable neck mass  Lung fields are clear to percussion and auscultation  Heart rate is regular  Systolic murmur  There is no palpable abdominal mass  Abdomen is soft and nontender  There is no ascites  There is no edema of the ankles  There is no calf tenderness  There is no  focal neurological deficit, no skin rash, no palpable lymphadenopathy,  no clubbing  Patient is not anxious  Performance status 0      IMAGING:      Labs, Imaging, & Other studies:       suggestion  Result Information displayed in this report will not trend and may not trigger automated decision support       Contains abnormal data CBC AND PLATELET  Order: [id number]   Ref Range & Units 4/27/23 12:37 PM   Hemoglobin 12 5 - 17 0 g/dL 11 7 Low     Hematocrit 37 0 - 48 0 % 35 5 Low     WBC 4 0 - 10 5 thou/cmm 4 8    RBC 4 00 - 5 40 mill/cmm 3 86 Low     Platelet Count 192 - 350 thou/cmm 227    MPV 7 5 - 11 3 fL 8 2    MCV 80 - 100 fL 92    MCH 27 0 - 36 0 pg 30 4    MCHC 32 0 - 37 0 g/dL 33 0    RDW 12 0 - 16 0 % 17 6 High     Specimen Collected: 04/27/23 12:37 PM Last Resulted: 04/27/23  9:58 PM   Received From: [address] [place] [address]  Result Received: " 23 11:09 AM   Contains abnormal data LIVER FUNCTION PANEL  Order: [id number]   Ref Range & Units 23 12:37 PM   Bilirubin, Total 0 2 - 1 0 mg/dL 1 2 High     Comment: Eltrombopag and its metabolites may interfere with this assay causing erroneously high patient results  Bilirubin, Direct 0 0 - 0 2 mg/dL 0 5 High     Alkaline Phosphatase 35 - 120 U/L 57    AST <41 U/L 33    ALT <56 U/L 27    Protein, Total 6 3 - 8 3 g/dL 6 7    Albumin 3 5 - 5 7 g/dL 3 0 Low     Specimen Collected: 23 12:37 PM Last Resulted: 23  9:30 PM   Received From: [address] [place] [address]  Result Received: 23 11:09 AM    Ferritin 337  CEA 5 9  TSH 8 9     SURGICAL PATHOLOGY  Order: [id number]  Component 23 12:00 AM   Surgical Pathology  [facility]   [address] [place] [address]   [phone]       MR#:             [id number]   Patient:         Leni CARLTON/Sex:         1950  M   Provider:        JOSE BUTTS   Location:        [facility]   Collect Date:    2023   [id number]     DIAGNOSIS  :   SIGMOID COLON, POLYP, POLYPECTOMY:   Tubular adenoma  Electronically Signed Out by Mason Blanca MD        All pertinent labs and imaging studies were personally reviewed      Reviewed and discussed with patient  Assessment and plan:  Patient is here with his wife  In  patient had right hemicolectomy for  stage II, clinically low risk cancer of ascending colon  He did not require adjuvant therapy  He was being followed until couple of years ago  Recently he was found to have abnormal liver function tests and increase in CEA to 8 1  Follow-up blood work showed improved liver function tests and CEA has come down to 5 9  He had a colonoscopy recently and that showed a tubular adenoma  He is feeling well  He has cut out sugar and fat from his diet  He is a vegetarian  He has stopped losing weight  He had lost some weight in the last few months  He states he is feeling well overall  He does not have any symptom at present except for minor arthritic symptoms     Physical examination and test results are as recorded and discussed  Patient is in remission from colon cancer except CEA is high but has come down from 8 1-5 9 and will be monitored once a month over the next 3 months and if problem persisted he will have CT scans  I explained this to patient and questions answered  Patient's physician has started him on Synthroid  Could anemia be secondary to hypothyroidism? Patient has follow-up office appointment  Goal is to find out more about increasing CEA  Plan is to monitor CEA  Patient is capable of self-care  All discussed in detail  Questions answered  Discussed the importance of eating healthy foods, staying active and health screening test       1  Malignant neoplasm of ascending colon ([facility] [address] )    - CEA; Standing  - CEA    2  Elevated tumor markers    - CEA; Standing  - CEA    3  Acquired hypothyroidism      Blood work every 4 weeks  Follow-up in 3 months  I used the dictation device to dictate this note and there could be mistakes in my note and patient may contact my office for that    Patient voiced understanding and agreement in the discussion         Counseling / Coordination of Care  Provided counseling and support

## 2023-04-30 NOTE — PROGRESS NOTES
HPI:   Patient is here with his wife  In 2011 patient had right hemicolectomy for  stage II, clinically low risk cancer of ascending colon  He did not require adjuvant therapy  He was being followed until couple of years ago  Recently he was found to have abnormal liver function tests and increase in CEA to 8 1  Follow-up blood work showed improved liver function tests and CEA has come down to 5 9  He had a colonoscopy recently and that showed a tubular adenoma  He is feeling well  He has cut out sugar and fat from his diet  He is a vegetarian  He has stopped losing weight  He had lost some weight in the last few months  He states he is feeling well overall  He does not have any symptom at present except for minor arthritic symptoms                                 Current Outpatient Medications:     levothyroxine 137 mcg tablet, Take by mouth, Disp: , Rfl:     B Complex Vitamins (VITAMIN B COMPLEX PO), Take 1 capsule by mouth (Patient not taking: Reported on 4/28/2023), Disp: , Rfl:     cholecalciferol (VITAMIN D3) 1,000 units tablet, Take by mouth  (Patient not taking: Reported on 4/28/2023), Disp: , Rfl:     cyanocobalamin (VITAMIN B-12) 1000 MCG tablet, Take by mouth (Patient not taking: Reported on 4/28/2023), Disp: , Rfl:     IRON PO, Take 325 mg by mouth (Patient not taking: Reported on 4/28/2023), Disp: , Rfl:     No Known Allergies    Oncology History    No history exists  ROS:   Reviewed 13 systems:  Presently no neurological, cardiac, pulmonary, GI and  symptoms  Other symptoms are in HPI  No  fever, chills, bleeding, bone pains, skin rash,   tiredness ,  weakness, numbness,  claudication and gait problem  No frequent infections  Not unusually sensitive to heat or cold  No swelling of the ankles  No swollen glands  Patient is not anxious           /70 (BP Location: Left arm, Patient Position: Sitting, Cuff Size: Standard)   Pulse 66   Temp 97 5 °F (36 4 °C) "(Temporal)   Ht 5' 4\" (1 626 m)   Wt 55 8 kg (123 lb)   SpO2 100%   BMI 21 11 kg/m²     Physical Exam:  Patient is alert and oriented  Patient is not in distress  Vital signs are above  There is no icterus  There is no oral thrush  There is no palpable neck mass  Lung fields are clear to percussion and auscultation  Heart rate is regular  Systolic murmur  There is no palpable abdominal mass  Abdomen is soft and nontender  There is no ascites  There is no edema of the ankles  There is no calf tenderness  There is no  focal neurological deficit, no skin rash, no palpable lymphadenopathy,  no clubbing  Patient is not anxious  Performance status 0  IMAGING:      Labs, Imaging, & Other studies:       suggestion  Result Information displayed in this report will not trend and may not trigger automated decision support       Contains abnormal data CBC AND PLATELET  Order: [id number]   Ref Range & Units 4/27/23 12:37 PM   Hemoglobin 12 5 - 17 0 g/dL 11 7 Low     Hematocrit 37 0 - 48 0 % 35 5 Low     WBC 4 0 - 10 5 thou/cmm 4 8    RBC 4 00 - 5 40 mill/cmm 3 86 Low     Platelet Count 389 - 350 thou/cmm 227    MPV 7 5 - 11 3 fL 8 2    MCV 80 - 100 fL 92    MCH 27 0 - 36 0 pg 30 4    MCHC 32 0 - 37 0 g/dL 33 0    RDW 12 0 - 16 0 % 17 6 High     Specimen Collected: 04/27/23 12:37 PM Last Resulted: 04/27/23  9:58 PM   Received From: [address] [place] [address]  Result Received: 04/28/23 11:09 AM   Contains abnormal data LIVER FUNCTION PANEL  Order: [id number]   Ref Range & Units 4/27/23 12:37 PM   Bilirubin, Total 0 2 - 1 0 mg/dL 1 2 High     Comment: Eltrombopag and its metabolites may interfere with this assay causing erroneously high patient results     Bilirubin, Direct 0 0 - 0 2 mg/dL 0 5 High     Alkaline Phosphatase 35 - 120 U/L 57    AST <41 U/L 33    ALT <56 U/L 27    Protein, Total 6 3 - 8 3 g/dL 6 7    Albumin 3 5 - 5 7 g/dL 3 0 Low     Specimen Collected: 04/27/23 12:37 PM Last Resulted: 04/27/23 "  9:30 PM   Received From: [address]  Result Received: 23 11:09 AM    Ferritin 337  CEA 5 9  TSH 8 9     SURGICAL PATHOLOGY  Order: [id number]  Component 23 12:00 AM   Surgical Pathology  [facility]   [address] [place] [address]   [phone]       MR#:             [id number]   Patient:         Omer Garcia   /Sex:         1950  M   Provider:        JOSE BUTTS   Location:        [facility]   Collect Date:    2023   [id number]     DIAGNOSIS  :   SIGMOID COLON, POLYP, POLYPECTOMY:   Tubular adenoma  Electronically Signed Out by Yi Pan MD        All pertinent labs and imaging studies were personally reviewed      Reviewed and discussed with patient  Assessment and plan:  Patient is here with his wife  In  patient had right hemicolectomy for  stage II, clinically low risk cancer of ascending colon  He did not require adjuvant therapy  He was being followed until couple of years ago  Recently he was found to have abnormal liver function tests and increase in CEA to 8 1  Follow-up blood work showed improved liver function tests and CEA has come down to 5 9  He had a colonoscopy recently and that showed a tubular adenoma  He is feeling well  He has cut out sugar and fat from his diet  He is a vegetarian  He has stopped losing weight  He had lost some weight in the last few months  He states he is feeling well overall  He does not have any symptom at present except for minor arthritic symptoms     Physical examination and test results are as recorded and discussed  Patient is in remission from colon cancer except CEA is high but has come down from 8 1-5 9 and will be monitored once a month over the next 3 months and if problem persisted he will have CT scans  I explained this to patient and questions answered  Patient's physician has started him on Synthroid  Could anemia be secondary to hypothyroidism? Patient has follow-up office appointment  Goal is to find out more about increasing CEA  Plan is to monitor CEA  Patient is capable of self-care  All discussed in detail  Questions answered  Discussed the importance of eating healthy foods, staying active and health screening test       1  Malignant neoplasm of ascending colon ([address] )    - CEA; Standing  - CEA    2  Elevated tumor markers    - CEA; Standing  - CEA    3  Acquired hypothyroidism      Blood work every 4 weeks  Follow-up in 3 months  I used the dictation device to dictate this note and there could be mistakes in my note and patient may contact my office for that    Patient voiced understanding and agreement in the discussion         Counseling / Coordination of Care  Provided counseling and support

## 2023-05-19 ENCOUNTER — TELEPHONE (OUTPATIENT)
Dept: OTHER | Facility: OTHER | Age: 73
End: 2023-05-19

## 2023-05-19 NOTE — TELEPHONE ENCOUNTER
Wife called to have lab order faxed to [place] [address] lab  Verified provider Dr Aleah Carranza and sent CEA order to [facility] lab on [address] at [phone]  Asked her to call back with any further questions or concerns

## 2023-06-06 ENCOUNTER — TELEPHONE (OUTPATIENT)
Dept: HEMATOLOGY ONCOLOGY | Facility: CLINIC | Age: 73
End: 2023-06-06

## 2023-06-06 NOTE — TELEPHONE ENCOUNTER
Hello,   I am reaching out regarding your appointment with Dr Elina Raygoza on 7/28/23     Unfortunately, the provider is out of the office on this day, and we will need to reschedule your appointment  Please call the [facility] at [phone] and we would be happy to assist you       Thank you,     Jaiden Almanzar  [phone])

## 2023-06-16 ENCOUNTER — TELEPHONE (OUTPATIENT)
Dept: HEMATOLOGY ONCOLOGY | Facility: CLINIC | Age: 73
End: 2023-06-16

## 2023-06-16 NOTE — TELEPHONE ENCOUNTER
Appointment Change  Cancel, Reschedule, Change to Virtual      Who are you speaking with? Patient   If it is not the patient, are they listed on an active communication consent form? N/A   Which provider is the appointment scheduled with? Dr Lisset Duarte   When is the appointment scheduled? Please list date and time 7/28/23 1:20PM   At which location is the appointment scheduled to take place? [facility]   Was the appointment rescheduled or changed from an in person visit to a virtual visit? If so, please list the details of the change  8/28/23 2:40PM   What is the reason for the appointment change? Provider   Was [facility] transport scheduled for this visit? No   Does [facility] need to be scheduled for the new visit (if applicable) No   Does the patient need an infusion appointment rescheduled? No   Does the patient have an infusion appointment scheduled? If so, when? No   Is the patient undergoing chemotherapy? No   Was the no-show policy reviewed for appointments being changed with less then 24 hours of notice?  No

## 2023-06-16 NOTE — TELEPHONE ENCOUNTER
Hello,     I am reaching out regarding your appointment with Dr Jose Nix on 7/28/23 @ 1:20pm    Unfortunately, the provider is out of the office on this day, and we will need to reschedule your appointment  Please call the [facility] at [phone] and we would be happy to assist you       Thank you,   Sarah Silva  [phone])

## 2023-08-28 ENCOUNTER — OFFICE VISIT (OUTPATIENT)
Dept: HEMATOLOGY ONCOLOGY | Facility: CLINIC | Age: 73
End: 2023-08-28
Payer: COMMERCIAL

## 2023-08-28 VITALS
BODY MASS INDEX: 22.02 KG/M2 | OXYGEN SATURATION: 97 % | HEIGHT: 64 IN | SYSTOLIC BLOOD PRESSURE: 140 MMHG | HEART RATE: 62 BPM | WEIGHT: 129 LBS | DIASTOLIC BLOOD PRESSURE: 80 MMHG | RESPIRATION RATE: 17 BRPM | TEMPERATURE: 98.1 F

## 2023-08-28 DIAGNOSIS — R97.8 ELEVATED TUMOR MARKERS: ICD-10-CM

## 2023-08-28 DIAGNOSIS — C18.2 MALIGNANT NEOPLASM OF ASCENDING COLON (HCC): Primary | ICD-10-CM

## 2023-08-28 DIAGNOSIS — E11.9 TYPE 2 DIABETES MELLITUS WITHOUT COMPLICATION, WITHOUT LONG-TERM CURRENT USE OF INSULIN (HCC): ICD-10-CM

## 2023-08-28 DIAGNOSIS — E03.9 ACQUIRED HYPOTHYROIDISM: ICD-10-CM

## 2023-08-28 PROCEDURE — 99214 OFFICE O/P EST MOD 30 MIN: CPT | Performed by: INTERNAL MEDICINE

## 2023-08-28 NOTE — PROGRESS NOTES
HPI:   Patient is here with his wife. 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 He did not require adjuvant therapy. Earlier this year he was found to have abnormal liver function tests and increase in CEA to 8.1. Also he had lost weight. Follow-up blood work showed improved liver function tests and CEA has come down to 2.4. He had GI evaluation including colonoscopy and ultrasound of abdomen. Colonoscopy showed a tubular adenoma. He is feeling well. He has cut out sugar and fat from his diet. He is a vegetarian. He has stopped losing weight. He states he is feeling well overall. He does not have any symptom at present except for minor arthritic symptoms. .                              Current Outpatient Medications:   •  B Complex Vitamins (VITAMIN B COMPLEX PO), Take 1 capsule by mouth, Disp: , Rfl:   •  cholecalciferol (VITAMIN D3) 1,000 units tablet, Take by mouth, Disp: , Rfl:   •  cyanocobalamin (VITAMIN B-12) 1000 MCG tablet, Take by mouth, Disp: , Rfl:   •  IRON PO, Take 325 mg by mouth, Disp: , Rfl:   •  levothyroxine 137 mcg tablet, Take by mouth, Disp: , Rfl:     No Known Allergies    Oncology History    No history exists. ROS:   Reviewed 13 systems:  Presently no neurological, cardiac, pulmonary, GI and  symptoms. Other symptoms are in HPI. No symptoms like fever, chills, bleeding, bone pains, skin rash, weight loss, night sweats, tiredness ,  weakness, numbness,  claudication and gait problem. No frequent infections. Not unusually sensitive to heat or cold. No swelling of the ankles. No swollen glands. Patient is not anxious. /80 (BP Location: Left arm, Patient Position: Sitting, Cuff Size: Adult)   Pulse 62   Temp 98.1 °F (36.7 °C)   Resp 17   Ht 5' 4" (1.626 m)   Wt 58.5 kg (129 lb)   SpO2 97%   BMI 22.14 kg/m²     Physical Exam:  Alert and oriented and not in distress. Vitals are above. No icterus. No oral thrush. No palpable neck mass. Clear lung fields. Regular heart rate. Systolic murmur. Soft and nontender abdomen. No palpable abdominal mass. No ascites. No edema of the ankles. No calf tenderness. No focal neurological deficit. No skin rash. There is no palpable lymphadenopathy in the neck and axillary areas,  no clubbing. Patient is not anxious. Performance status 0. IMAGING:  US ABDOMEN LIMITED (RUQ)4 /11/23  Order: [id number]  Impression    IMPRESSION:   Poor visualization of portions of the pancreas due to bowel gas. Otherwise,   unremarkable right upper quadrant ultrasound. The visualized portions of the pancreatic head and body are unremarkable. The   pancreatic tail is obscured by bowel gas        Workstation:[id number]      Labs, Imaging, & Other studies:       7/1/23 10:00 AM    Hemoglobin 12.5 - 17.0 g/dL 12.7    Hematocrit 37.0 - 48.0 % 37.3    WBC 4.0 - 10.5 thou/cmm 5.3    RBC 4.00 - 5.40 mill/cmm 4.07    Platelet Count 223 - 350 thou/cmm 215    MPV 7.5 - 11.3 fL 7.8    MCV 80 - 100 fL 92    MCH 27.0 - 36.0 pg 31.2    MCHC 32.0 - 37.0 g/dL 34.0    RDW 12.0 - 16.0 % 12.4       8/26/23  7:11 AM    Carcinoembryonic Antigen <3.0 ng/mL 3.4 High       7/1/23 10:00 AM     Glucose 65 - 99 mg/dL 107 High     BUN 7 - 28 mg/dL 11    Creatinine 0.53 - 1.30 mg/dL 0.81    Sodium 135 - 145 mmol/L 137    Potassium 3.5 - 5.2 mmol/L 4.2    Chloride 100 - 109 mmol/L 103    Carbon Dioxide 21 - 31 mmol/L 25    Calcium 8.5 - 10.1 mg/dL 8.9    Alkaline Phosphatase 35 - 120 U/L 65    Albumin 3.5 - 5.7 g/dL 3.6    Bilirubin, Total 0.2 - 1.0 mg/dL 0.7    Comment: Eltrombopag and its metabolites may interfere with this assay causing erroneously high patient results.    Protein, Total 6.3 - 8.3 g/dL 6.7    AST <41 U/L 23    ALT <56 U/L 18    Anion Gap 3 - 11 9    eGFRcr >59 93    eGFRcr Comment  Interpretive information: calculated GFR    Comment:                                          Order: [id number]   Ref Range & Units 23 10:00 AM   Thyroid Stimulating Hormone 0.45 - 5.33 uIU/mL 1.04              Order: [id number]  Component 23 12:00 AM   Surgical Pathology  [facility]   [address] [place] [address]   [phone]       MR#:             [id number]   Patient:         Zuleima Perez   /Sex:         1950  M   Provider:        JOSE BUTTS   Location:        [facility]   Collect Date:    2023   [id number]     DIAGNOSIS  :   SIGMOID COLON, POLYP, POLYPECTOMY:   Tubular adenoma. Electronically Signed Out by Domingo Magallanes MD              Reviewed and discussed with patient. Assessment and plan:  Patient is here with his wife. Follow-up visit for stage II low risk cancer of right colon in  and patient had right hemicolectomy. He did not require adjuvant therapy. Earlier this year he was found to have abnormal liver function tests and increase in CEA to 8.1. Also he had lost weight. Follow-up blood work showed improved liver function tests and CEA has come down to 2.4. He had GI evaluation including colonoscopy and ultrasound of abdomen. Colonoscopy showed a tubular adenoma. He is feeling well. He has cut out sugar and fat from his diet. He is a vegetarian. He has stopped losing weight. He states he is feeling well overall. He does not have any symptom at present except for minor arthritic symptoms. .  Physical examination and test results are as recorded and discussed. Patient is in remission from colon cancer . CEA is high but has come down from 8.1-5.9-3.4 and will be monitored. Improved LFTs. No anemia. I explained this to patient and questions answered. Patient's physician had started him on Synthroid. Goal is cure from colon cancer. Plan is surveillance and to monitor CEA. Patient is capable of self-care. All discussed in detail. Questions answered.   Discussed the importance of eating healthy foods, staying active and health screening test.      1. Malignant neoplasm of ascending colon (HCC)    - CBC and differential; Standing  - Comprehensive metabolic panel; Standing  - CEA; Standing  - CBC and differential  - Comprehensive metabolic panel  - CEA    2. Elevated tumor markers    - CEA; Standing  - CEA    3. Acquired hypothyroidism      4. Type 2 diabetes mellitus without complication, without long-term current use of insulin (HCC)-he does not like to have medication for diabetes mellitus      Blood work every 3 months. Visit in 6 months. I used the dictation device to dictate this note and there could be mistakes in my note and patient may contact my office for that    Patient voiced understanding and agreement in the discussion.        Counseling / Coordination of Care  Provided counseling and support

## 2024-02-29 ENCOUNTER — OFFICE VISIT (OUTPATIENT)
Dept: HEMATOLOGY ONCOLOGY | Facility: CLINIC | Age: 74
End: 2024-02-29
Payer: COMMERCIAL

## 2024-02-29 VITALS
RESPIRATION RATE: 17 BRPM | TEMPERATURE: 96.9 F | HEART RATE: 64 BPM | DIASTOLIC BLOOD PRESSURE: 74 MMHG | OXYGEN SATURATION: 98 % | WEIGHT: 135 LBS | SYSTOLIC BLOOD PRESSURE: 118 MMHG | BODY MASS INDEX: 23.05 KG/M2 | HEIGHT: 64 IN

## 2024-02-29 DIAGNOSIS — E03.9 ACQUIRED HYPOTHYROIDISM: ICD-10-CM

## 2024-02-29 DIAGNOSIS — R97.8 ELEVATED TUMOR MARKERS: ICD-10-CM

## 2024-02-29 DIAGNOSIS — E11.9 TYPE 2 DIABETES MELLITUS WITHOUT COMPLICATION, WITHOUT LONG-TERM CURRENT USE OF INSULIN (HCC): ICD-10-CM

## 2024-02-29 DIAGNOSIS — C18.2 MALIGNANT NEOPLASM OF ASCENDING COLON (HCC): Primary | ICD-10-CM

## 2024-02-29 PROCEDURE — G2211 COMPLEX E/M VISIT ADD ON: HCPCS | Performed by: INTERNAL MEDICINE

## 2024-02-29 PROCEDURE — 99214 OFFICE O/P EST MOD 30 MIN: CPT | Performed by: INTERNAL MEDICINE

## 2024-02-29 NOTE — PROGRESS NOTES
"  HPI: Continuation of care.  Patient is here with his wife.  In 2011 1 patient was diagnosed to have low risk stage II right colon cancer.  Patient had right hemicolectomy.  He did not require adjuvant therapy.  Patient is feeling well at present.  He is not losing weight anymore and actually has gained weight.    In 2023 he was found to have abnormal liver function tests and increase in CEA to 8.1.  Also he had lost weight.  Follow-up blood work showed improved liver function tests and CEA had come down to 3.4.  He had GI evaluation including colonoscopy and ultrasound of abdomen.  Colonoscopy showed a tubular adenoma.     He has cut out sugar and fat from his diet.  He is a vegetarian.    He has minor arthritic symptoms.  .                              Current Outpatient Medications:     B Complex Vitamins (VITAMIN B COMPLEX PO), Take 1 capsule by mouth, Disp: , Rfl:     cholecalciferol (VITAMIN D3) 1,000 units tablet, Take by mouth, Disp: , Rfl:     cyanocobalamin (VITAMIN B-12) 1000 MCG tablet, Take by mouth, Disp: , Rfl:     IRON PO, Take 325 mg by mouth, Disp: , Rfl:     levothyroxine 137 mcg tablet, Take by mouth, Disp: , Rfl:     No Known Allergies    Oncology History    No history exists.       ROS:   Reviewed 13 systems:  Presently no neurological, cardiac, pulmonary, GI and  symptoms.  Other symptoms are in HPI.  No fever, chills, bleeding, bone pains, skin rash, weight loss, night sweats, tiredness ,  weakness, numbness,  claudication and gait problem. No frequent infections.  Not unusually sensitive to heat or cold. No swelling of the ankles. No swollen glands.  Patient is not anxious.         /74 (BP Location: Right arm, Patient Position: Sitting, Cuff Size: Adult)   Pulse 64   Temp (!) 96.9 °F (36.1 °C)   Resp 17   Ht 5' 4\" (1.626 m)   Wt 61.2 kg (135 lb)   SpO2 98%   BMI 23.17 kg/m²     Physical Exam:  Patient is alert and oriented.  Patient is not in distress.  Vital signs as above.  " There is no icterus.  There is no oral thrush.  There is no palpable neck mass.  Lung fields are clear to percussion auscultation.  Heart rate is regular.  Systolic murmur.  There is no palpable abdominal mass.  Abdomen is soft and nontender.  There is no ascites.  There is no edema of the ankles.  There is no calf tenderness.  There is no focal neurological deficit.  There is no skin rash.  No palpable lymphadenopathy in the neck and axillary areas,  no clubbing.    Patient is not anxious.  Performance status 0.    IMAGING:    [facility]  Outside Information  Results  US ABDOMEN LIMITED (RUQ) (Order [id number])      suggestion  Information displayed in this report may not trend or trigger automated decision support.     US ABDOMEN LIMITED (RUQ)  Order: [id number]  Impression    IMPRESSION:  Poor visualization of portions of the pancreas due to bowel gas. Otherwise,  unremarkable right upper quadrant ultrasound.          Workstation:[id number]  Narrative    Clinical History: Elevated AST (SGOT) [R74.01 (ICD-10-CM)]; Elevated bilirubin  [R17 (ICD-10-CM)]; Biliary and gallbladder disorder [K83.9 (ICD-10-CM)]    Comparison: No previous abdominal ultrasound.    Comment: A right upper quadrant ultrasound was performed.    The visualized portions of the pancreatic head and body are unremarkable. The  pancreatic tail is obscured by bowel gas. The liver is normal in size and  echogenicity measuring 12.5 cm craniocaudally. There are no focal hepatic  lesions appreciated. The gallbladder is normal in appearance. The patient was  not focally tender over the gallbladder examination. There is no evidence of  biliary ductal dilatation with the extra hepatic bile duct measuring 3 mm in  diameter. Survey views of the right kidney show no hydronephrosis.  Exam End: 04/11/23 12:18 PM    Specimen Collected: 04/11/23 12:22 PM Last Resulted: 04/11/23 12:23 PM   Received From: [facility]  Result Received:  02/27/24  4:10 PM     Lab:        [facility]  Outside Information  CBC AND PLATELET  Status: Final result         suggestion  Result Information displayed in this report will not trend and may not trigger automated decision support.      Contains abnormal data CBC AND PLATELET  Order: [id number]  Component  Ref Range & Units 1/5/24  6:20 AM   Hemoglobin  12.5 - 17.0 g/dL 12.6   Hematocrit  37.0 - 48.0 % 36.7 Low    WBC  4.0 - 10.5 thou/cmm 4.4   RBC  4.00 - 5.40 mill/cmm 4.22   Platelet Count  140 - 350 thou/cmm 194   MPV  7.5 - 11.3 fL 8.3   MCV  80 - 100 fL 87   MCH  27.0 - 36.0 pg 29.8   MCHC  32.0 - 37.0 g/dL 34.3   RDW  12.0 - 16.0 % 12.6     Specimen Collected: 01/05/24  6:20 AM    Performed by: [email or web address]) Last Resulted: 01/05/24 11:01 AM   Received From: [facility]  Result Received: 02/27/24  4:11 P   ntains abnormal data CEA  Order: [id number]  Component  Ref Range & Units 1/5/24  6:20 AM   Carcinoembryonic Antigen  <3.0 ng/mL 3.5 High    Comment: Testing performed using [employer]. Results obtained from different manufacturers and methods cannot be used interchangeably.     [facility]  Outside Information  COMPREHENSIVE METABOLIC PANEL  Status: Final result         Contains abnormal data COMPREHENSIVE METABOLIC PANEL  Order: [id number]  Status: Final result       Next appt: 08/29/2024 at 02:20 PM in Hematology and Oncology (Drake Carreon MD)              Component  Ref Range & Units 1/5/24  6:20 AM 10/11/23  6:15 AM 7/1/23 10:00 AM 4/18/23  6:36 AM 3/28/23  6:36 AM 9/10/22  8:16 AM 4/7/22  7:30 AM   Glucose  65 - 99 mg/dL 117 High  95 107 High  85 99 143 High  137 High    BUN  7 - 28 mg/dL 12 11 11 6 Low  11 11 12   Creatinine  0.53 - 1.30 mg/dL 0.82 0.86 0.81 0.69 0.88 0.89 0.73   Sodium  135 - 145 mmol/L 136 142 137 138 141 139 138   Potassium  3.5 - 5.2 mmol/L 4.2 4.7 4.2 4.8 4.2 5.3 High  4.5   Chloride  100 - 109 mmol/L 102 104 103  102 102 105 104   Carbon Dioxide  21 - 31 mmol/L 26 30 25 30 29 31 R 26 R   Calcium  8.5 - 10.1 mg/dL 8.7 9.4 8.9 8.3 Low  8.9 9.2 9.4   Alkaline Phosphatase  35 - 120 U/L 57 57 65 59 94 68 74   ALBUMIN  3.5 - 5.7 g/dL 3.8 3.8 3.6 2.9 Low  3.1 Low  3.5 R 3.5 R   Total Bilirubin  0.2 - 1.0 mg/dL 0.5 1.0 CM 0.7 CM 1.9 High  CM 6.5 High  CM 0.7 CM 0.5 CM   Comment: Eltrombopag and its metabolites may interfere with this assay causing erroneously high patient results.   Protein, Total  6.3 - 8.3 g/dL 6.6 6.9 6.7 6.6 6.6 6.9 6.9   AST  <41 U/L 17 19 23 30 282 High  11 15   ALT  <56 U/L 14 12 18 30 264 High  22 23   ANION GAP  3 - 11 8 8 9 6 10 3 8   eGFRcr  >59 92 91 93 98 91 91 97   eGFR Comment Interpretive information: calculated GFR Interpretive information: calculated GFR CM Interpretive information: calculated GFR CM Interpretive information: calculated GFR CM Interpretive information: calculated GFR CM Interpretive information: calculated GFR CM Interpretive information: calculated GFR CM   Comment:                                                   Reviewed and discussed with patient.    Assessment and plan:  Continuation of care.  Patient is here with his wife.  In 2011 1 patient was diagnosed to have low risk stage II right colon cancer.  Patient had right hemicolectomy.  He did not require adjuvant therapy.  Patient is feeling well at present.  He is not losing weight anymore and actually has gained weight.    In 2023 he was found to have abnormal liver function tests and increase in CEA to 8.1.  Also he had lost weight.  Follow-up blood work showed improved liver function tests and CEA had come down to 3.4.  He had GI evaluation including colonoscopy and ultrasound of abdomen.  Colonoscopy showed a tubular adenoma.     He has cut out sugar and fat from his diet.  He is a vegetarian.    He has minor arthritic symptoms.  .                            .  Physical examination and test results are as recorded and  discussed.  Patient is in remission from colon cancer . CEA was high but has come down from 8.1-5.9-3.4-3.5 and will be monitored.  Improved LFTs.  No anemia.  I explained this to patient and questions answered.  Patient's physician had started him on Synthroid.  Goal is cure from colon cancer.  Plan is surveillance and to monitor CEA.  Patient is capable of self-care.  All discussed in detail.  Questions answered.  Discussed the importance of eating healthy foods, staying active and health screening test.      1. Malignant neoplasm of ascending colon (HCC)    - CBC and differential; Standing  - Comprehensive metabolic panel; Standing  - CEA; Standing    2. Elevated tumor markers    - CEA; Standing    3. Acquired hypothyroidism      4. Type 2 diabetes mellitus without complication, without long-term current use of insulin (HCC)      Blood work every 3 months.  Visit in 6 months.    I used the dictation device to dictate this note and there could be mistakes in my note and patient may contact my office for that    Patient voiced understanding and agreement in the discussion.       Counseling / Coordination of Care  Provided counseling and support